# Patient Record
Sex: FEMALE | Race: OTHER | Employment: UNEMPLOYED | URBAN - METROPOLITAN AREA
[De-identification: names, ages, dates, MRNs, and addresses within clinical notes are randomized per-mention and may not be internally consistent; named-entity substitution may affect disease eponyms.]

---

## 2018-08-20 ENCOUNTER — HOSPITAL ENCOUNTER (EMERGENCY)
Age: 33
Discharge: HOME | End: 2018-08-20
Attending: EMERGENCY MEDICINE
Payer: SELF-PAY

## 2018-08-20 VITALS
HEART RATE: 74 BPM | HEIGHT: 70 IN | OXYGEN SATURATION: 100 % | SYSTOLIC BLOOD PRESSURE: 110 MMHG | DIASTOLIC BLOOD PRESSURE: 60 MMHG | BODY MASS INDEX: 27.63 KG/M2 | TEMPERATURE: 97.8 F | WEIGHT: 193 LBS | RESPIRATION RATE: 16 BRPM

## 2018-08-20 DIAGNOSIS — N93.8 DUB (DYSFUNCTIONAL UTERINE BLEEDING): Primary | ICD-10-CM

## 2018-08-20 LAB
BASOPHILS # BLD: 0 K/UL
BASOPHILS NFR BLD: 0 % (ref 0–2)
DIFFERENTIAL METHOD BLD: ABNORMAL
EOSINOPHIL # BLD: 0.1 K/UL
EOSINOPHIL NFR BLD: 3 % (ref 0.5–7.8)
ERYTHROCYTE [DISTWIDTH] IN BLOOD BY AUTOMATED COUNT: 16.8 %
HCG UR QL: NEGATIVE
HCT VFR BLD AUTO: 32.5 % (ref 35.8–46.3)
HGB BLD-MCNC: 10.4 G/DL (ref 11.7–15.4)
IMM GRANULOCYTES # BLD: 0 K/UL
IMM GRANULOCYTES NFR BLD AUTO: 1 % (ref 0–5)
LYMPHOCYTES # BLD: 1 K/UL
LYMPHOCYTES NFR BLD: 33 % (ref 13–44)
MCH RBC QN AUTO: 29.1 PG (ref 26.1–32.9)
MCHC RBC AUTO-ENTMCNC: 32 G/DL (ref 31.4–35)
MCV RBC AUTO: 91 FL (ref 79.6–97.8)
MONOCYTES # BLD: 0.3 K/UL
MONOCYTES NFR BLD: 11 % (ref 4–12)
NEUTS SEG # BLD: 1.7 K/UL
NEUTS SEG NFR BLD: 53 % (ref 43–78)
NRBC # BLD: 0 K/UL (ref 0–0.2)
PLATELET # BLD AUTO: 297 K/UL (ref 150–450)
PMV BLD AUTO: 10.3 FL (ref 9.4–12.3)
RBC # BLD AUTO: 3.57 M/UL (ref 4.05–5.2)
WBC # BLD AUTO: 3.1 K/UL (ref 4.3–11.1)

## 2018-08-20 PROCEDURE — 85025 COMPLETE CBC W/AUTO DIFF WBC: CPT

## 2018-08-20 PROCEDURE — 81025 URINE PREGNANCY TEST: CPT

## 2018-08-20 PROCEDURE — 99284 EMERGENCY DEPT VISIT MOD MDM: CPT | Performed by: PHYSICIAN ASSISTANT

## 2018-08-20 PROCEDURE — 81003 URINALYSIS AUTO W/O SCOPE: CPT | Performed by: PHYSICIAN ASSISTANT

## 2018-08-20 RX ORDER — MEDROXYPROGESTERONE ACETATE 10 MG/1
10 TABLET ORAL DAILY
Qty: 7 TAB | Refills: 0 | Status: SHIPPED | OUTPATIENT
Start: 2018-08-20 | End: 2018-08-27

## 2018-08-20 NOTE — ED NOTES
States vaginal bleeding heavy for 10 days. Hx of the this in the past and was given depo and bleeding stopped.

## 2018-08-20 NOTE — ED NOTES
I have reviewed discharge instructions with the patient. The patient verbalized understanding. Patient left ED via Discharge Method: ambulatory to Home with self. Opportunity for questions and clarification provided. Patient given 1 scripts. To continue your aftercare when you leave the hospital, you may receive an automated call from our care team to check in on how you are doing. This is a free service and part of our promise to provide the best care and service to meet your aftercare needs.  If you have questions, or wish to unsubscribe from this service please call [phone]. Thank you for Choosing our [address] [place] [address] Emergency Department.

## 2018-08-20 NOTE — DISCHARGE INSTRUCTIONS
Sangrado uterino anormal: Instrucciones de cuidado - [ Abnormal Uterine Bleeding: Care Instructions ]  Instrucciones de cuidado    El sangrado uterino anormal (AUB, por greta siglas en inglés) es un sangrado irregular del útero que dura más o es más intenso de lo normal o que no ocurre en el momento habitual para usted. A veces, se debe a cambios en los niveles hormonales. También puede estar causado por crecimientos en el útero, tales jeff fibromas o pólipos. A veces no se puede encontrar la causa. Podría tener mucho sangrado cuando no está esperando gold período. Gold médico puede sugerirle tanika prueba de embarazo si jonathan que está [place]. La atención de seguimiento es tanika parte clave de gold tratamiento y seguridad. Asegúrese de hacer y acudir a todas las citas, y llame a gold médico si está teniendo problemas. También es tanika buena idea saber los resultados de greta exámenes y mantener tanika lista de los medicamentos que chani. ¿Cómo puede cuidarse en el hogar? · Sea kevin con los medicamentos. [place] los analgésicos (medicamentos para el dolor) exactamente jeff le fueron indicados. ¨ Si el médico le recetó un analgésico, tómelo según las indicaciones. ¨ Si no está tomando un analgésico recetado, pregúntele a gold médico si puede martha mickey de [employer]. · Podría faltarle dat por la pérdida de tamika. Coma tanika dieta equilibrada con alto contenido de dat y vitamina C. Entre los alimentos ricos en dat se encuentran las yoan nichole, los [place], los [place], los frijoles (habichuelas) y las verduras de hojas verdes. Pregúntele a gold médico si necesita martha pastillas de dat o un multivitamínico.  ¿Cuándo debe pedir ayuda? Llame al 911 en cualquier momento que considere que necesita atención de [place].  Por ejemplo, llame si:    · Se desmayó (perdió el conocimiento).    Llame a gold médico ahora mismo o busque atención médica inmediata si:    · Tiene dolor repentino e intenso en el abdomen o la pelvis.     · Tiene sangrado vaginal intenso. Empapa greta toallas sanitarias o tampones habituales cada hora norma 2 o más horas.     · Siente mareos o aturdimiento, o que está a punto de desmayarse.    Preste especial atención a los cambios en gold janell y asegúrese de comunicarse con gold médico si:    · Tiene un dolor nuevo en el abdomen o la pelvis.     · Tiene fiebre.     · El sangrado empeora o dura más de 1 semana.     · Piensa que podría estar embarazada. ¿Dónde puede encontrar más información en inglés? Alysha Redd a http://paul-alicia.info/. Charlie Giraldo F396 en la búsqueda para aprender más acerca de \"Sangrado uterino anormal: Instrucciones de cuidado - [ Abnormal Uterine Bleeding: Care Instructions ]. \"  Revisado: 6 octubre, 2017  Versión del contenido: 11.7  © 5164-1730 Healthwise, Incorporated. Las instrucciones de cuidado fueron adaptadas bajo licencia por [employer] (which disclaims liability or warranty for this information). Si usted tiene [place] afección médica o sobre estas instrucciones, siempre pregunte a gold profesional de janell. Healthwise, Incorporated niega toda garantía o responsabilidad por gold uso de esta información.

## 2018-08-20 NOTE — ED PROVIDER NOTES
HPI Comments: Pt with h/o off and on heavy periods, no pain, no pmd, no meds, periods are regular     Patient is a 29 y.o. female presenting with vaginal bleeding. The history is provided by the patient. Vaginal Bleeding   This is a new problem. The current episode started more than 1 week ago. The problem occurs constantly. The problem has not changed since onset. Pertinent negatives include no abdominal pain. Nothing aggravates the symptoms. Nothing relieves the symptoms. She has tried nothing for the symptoms. The treatment provided no relief. No past medical history on file. No past surgical history on file. No family history on file. Social History     Social History    Marital status: SINGLE     Spouse name: N/A    Number of children: N/A    Years of education: N/A     Occupational History    Not on file. Social History Main Topics    Smoking status: Not on file    Smokeless tobacco: Not on file    Alcohol use Not on file    Drug use: Not on file    Sexual activity: Not on file     Other Topics Concern    Not on file     Social History Narrative         ALLERGIES: Review of patient's allergies indicates no known allergies. Review of Systems   Gastrointestinal: Negative for abdominal pain. Genitourinary: Positive for vaginal bleeding. All other systems reviewed and are negative. Vitals:    08/20/18 1305   BP: 119/56   Pulse: 86   Resp: 16   Temp: 97.8 °F (36.6 °C)   SpO2: 100%   Weight: 87.5 kg (193 lb)   Height: 5' 10\" (1.778 m)            Physical Exam   Constitutional: She is oriented to person, place, and time. She appears well-developed and well-nourished. No distress. HENT:   Head: Normocephalic and atraumatic. Right Ear: External ear normal.   Left Ear: External ear normal.   Eyes: Conjunctivae and EOM are normal. Pupils are equal, round, and reactive to light. Neck: Normal range of motion. Neck supple. Cardiovascular: Normal rate and regular rhythm. Pulmonary/Chest: Effort normal and breath sounds normal. No respiratory distress. She has no wheezes. Abdominal: Soft. Bowel sounds are normal. There is no tenderness. There is no rebound and no guarding. Musculoskeletal: Normal range of motion. She exhibits no edema. Neurological: She is alert and oriented to person, place, and time. Skin: Skin is warm. Nursing note and vitals reviewed.        MDM  Number of Diagnoses or Management Options  Diagnosis management comments: hgb 10.4  Will treat daily iron and provera, stressed need for gyn/pmd        Amount and/or Complexity of Data Reviewed  Clinical lab tests: ordered and reviewed    Risk of Complications, Morbidity, and/or Mortality  Presenting problems: moderate  Diagnostic procedures: moderate  Management options: low    Patient Progress  Patient progress: improved        ED Course       Procedures

## 2018-08-20 NOTE — LETTER
[facility] EMERGENCY DEPT 
[address] [place] [id number] 
[phone] Work/School Note Date: 8/20/2018 To Whom It May concern: 
 
Marialuisa Selby was seen and treated today in the emergency room by the following provider(s): 
Attending Provider: Raysa Davis MD 
Physician Assistant: BOO Lopes. Marialuisa Selby may return to work on 8-21-18. Sincerely, BOO Lopes

## 2018-12-29 ENCOUNTER — APPOINTMENT (OUTPATIENT)
Dept: ULTRASOUND IMAGING | Age: 33
End: 2018-12-29
Attending: STUDENT IN AN ORGANIZED HEALTH CARE EDUCATION/TRAINING PROGRAM
Payer: SELF-PAY

## 2018-12-29 ENCOUNTER — HOSPITAL ENCOUNTER (EMERGENCY)
Age: 33
Discharge: HOME | End: 2018-12-29
Attending: STUDENT IN AN ORGANIZED HEALTH CARE EDUCATION/TRAINING PROGRAM
Payer: SELF-PAY

## 2018-12-29 VITALS
RESPIRATION RATE: 16 BRPM | SYSTOLIC BLOOD PRESSURE: 114 MMHG | TEMPERATURE: 98 F | HEIGHT: 70 IN | HEART RATE: 90 BPM | OXYGEN SATURATION: 100 % | WEIGHT: 199 LBS | DIASTOLIC BLOOD PRESSURE: 57 MMHG | BODY MASS INDEX: 28.49 KG/M2

## 2018-12-29 DIAGNOSIS — N93.9 ABNORMAL UTERINE BLEEDING: Primary | ICD-10-CM

## 2018-12-29 LAB
BACTERIA URNS QL MICRO: 0 /HPF
CASTS URNS QL MICRO: NORMAL /LPF
CRYSTALS URNS QL MICRO: NORMAL /LPF
EPI CELLS #/AREA URNS HPF: NORMAL /HPF
ERYTHROCYTE [DISTWIDTH] IN BLOOD BY AUTOMATED COUNT: 13.4 % (ref 11.9–14.6)
HCG UR QL: NEGATIVE
HCT VFR BLD AUTO: 25.4 % (ref 35.8–46.3)
HGB BLD-MCNC: 8 G/DL (ref 11.7–15.4)
MCH RBC QN AUTO: 30.2 PG (ref 26.1–32.9)
MCHC RBC AUTO-ENTMCNC: 31.5 G/DL (ref 31.4–35)
MCV RBC AUTO: 95.8 FL (ref 79.6–97.8)
NRBC # BLD: 0.03 K/UL (ref 0–0.2)
PLATELET # BLD AUTO: 357 K/UL (ref 150–450)
PMV BLD AUTO: 9.5 FL (ref 9.4–12.3)
RBC # BLD AUTO: 2.65 M/UL (ref 4.05–5.2)
RBC #/AREA URNS HPF: NORMAL /HPF
WBC # BLD AUTO: 5.3 K/UL (ref 4.3–11.1)
WBC URNS QL MICRO: NORMAL /HPF

## 2018-12-29 PROCEDURE — 85027 COMPLETE CBC AUTOMATED: CPT

## 2018-12-29 PROCEDURE — 76856 US EXAM PELVIC COMPLETE: CPT

## 2018-12-29 PROCEDURE — 99284 EMERGENCY DEPT VISIT MOD MDM: CPT | Performed by: STUDENT IN AN ORGANIZED HEALTH CARE EDUCATION/TRAINING PROGRAM

## 2018-12-29 PROCEDURE — 81003 URINALYSIS AUTO W/O SCOPE: CPT | Performed by: STUDENT IN AN ORGANIZED HEALTH CARE EDUCATION/TRAINING PROGRAM

## 2018-12-29 PROCEDURE — 81025 URINE PREGNANCY TEST: CPT

## 2018-12-29 PROCEDURE — 81015 MICROSCOPIC EXAM OF URINE: CPT

## 2018-12-29 RX ORDER — NORGESTIMATE AND ETHINYL ESTRADIOL 0.25-0.035
KIT ORAL
Qty: 1 DOSE PACK | Refills: 0 | Status: SHIPPED | OUTPATIENT
Start: 2018-12-29

## 2018-12-29 NOTE — ED TRIAGE NOTES
Pt here with 14 days of vaginal bleeding. Reports is passing clots today so decided to come to ED.   Reports called GYN earlier this week, but told she needed to stop bleeding for them to evaluate

## 2018-12-29 NOTE — ED NOTES
I have reviewed discharge instructions with the patient. The patient verbalized understanding. Patient left ED via Discharge Method: ambulatory to Home with friend / family. Opportunity for questions and clarification provided. Patient given 1 scripts. To continue your aftercare when you leave the hospital, you may receive an automated call from our care team to check in on how you are doing. This is a free service and part of our promise to provide the best care and service to meet your aftercare needs.  If you have questions, or wish to unsubscribe from this service please call [phone]. Thank you for Choosing our [facility] Emergency Department.

## 2018-12-29 NOTE — ED NOTES
Pt sts having heavy vag bleeding over last 14 days; her gyn doctor sent to er. Pt sts she having no pain or other sx at this time.  sts has used multi pads and tampons over last 6 hr.  at bedside

## 2018-12-29 NOTE — ED PROVIDER NOTES
presents to the emergency department with reports of abnormal uterine bleeding. Patient states she started bleeding probably 2 weeks ago and has been bleeding heavily since. She contacted her OB/GYN provider who instructed her to come to the emergency department as they could not see her until the bleeding had stopped. Patient denies any other associated symptoms. There is no associated pain, fever, nausea, shortness of breath, lightheaded or dizzy feeling. She denies fatigue or weakness. She denies bleeding from anywhere else. She has experienced abnormal uterine bleeding in the past that was treated with oral contraceptive pills. She denies associated discharge, hematuria or dysuria. The history is provided by the patient and the spouse. Vaginal Bleeding   This is a new problem. The current episode started more than 1 week ago. The problem occurs constantly. The problem has not changed since onset. Pertinent negatives include no chest pain, no abdominal pain, no headaches and no shortness of breath. The treatment provided no relief. History reviewed. No pertinent past medical history. History reviewed. No pertinent surgical history. History reviewed. No pertinent family history.     Social History     Socioeconomic History    Marital status: SINGLE     Spouse name: Not on file    Number of children: Not on file    Years of education: Not on file    Highest education level: Not on file   Social Needs    Financial resource strain: Not on file    Food insecurity - worry: Not on file    Food insecurity - inability: Not on file    Transportation needs - medical: Not on file   ebindle needs - non-medical: Not on file   Occupational History    Not on file   Tobacco Use    Smoking status: Never Smoker   Substance and Sexual Activity    Alcohol use: No     Frequency: Never    Drug use: No    Sexual activity: Not on file   Other Topics Concern    Not on file   Social History Narrative    Not on file         ALLERGIES: Patient has no known allergies. Review of Systems   Constitutional: Negative for chills, diaphoresis and fever. HENT: Negative for congestion, sneezing and sore throat. Eyes: Negative for visual disturbance. Respiratory: Negative for cough, chest tightness, shortness of breath and wheezing. Cardiovascular: Negative for chest pain and leg swelling. Gastrointestinal: Negative for abdominal pain, blood in stool, diarrhea, nausea and vomiting. Endocrine: Negative for polyuria. Genitourinary: Positive for vaginal bleeding. Negative for difficulty urinating, dysuria, flank pain, hematuria and urgency. Musculoskeletal: Negative for back pain, myalgias, neck pain and neck stiffness. Skin: Negative for color change and rash. Neurological: Negative for dizziness, syncope, speech difficulty, weakness, light-headedness, numbness and headaches. Psychiatric/Behavioral: Negative for behavioral problems. All other systems reviewed and are negative. Vitals:    12/29/18 1236   BP: 108/64   Pulse: 92   Resp: 17   Temp: 97.8 °F (36.6 °C)   SpO2: 100%   Weight: 90.3 kg (199 lb)   Height: 5' 10\" (1.778 m)            Physical Exam   Constitutional: She is oriented to person, place, and time. She appears well-developed and well-nourished. No distress. Well-appearing female patient Alert and oriented to person place and time. No acute distress, speaks in clear, fluid sentences. HENT:   Head: Normocephalic and atraumatic. Right Ear: External ear normal.   Left Ear: External ear normal.   Nose: Nose normal.   Eyes: EOM are normal. Pupils are equal, round, and reactive to light. Neck: Normal range of motion. Cardiovascular: Normal rate, regular rhythm, normal heart sounds and intact distal pulses. Exam reveals no gallop and no friction rub. No murmur heard. Pulmonary/Chest: Effort normal and breath sounds normal. No respiratory distress.  She has no wheezes. She has no rales. She exhibits no tenderness. Abdominal: Soft. She exhibits no distension and no mass. There is no tenderness. There is no rebound and no guarding. No hernia. No focal findings   Musculoskeletal: Normal range of motion. She exhibits no edema, tenderness or deformity. Neurological: She is alert and oriented to person, place, and time. No cranial nerve deficit. Skin: Skin is warm and dry. She is not diaphoretic. Nursing note and vitals reviewed.        MDM       Procedures

## 2018-12-29 NOTE — DISCHARGE INSTRUCTIONS
Abnormal Uterine Bleeding: Care Instructions  Your Care Instructions    Abnormal uterine bleeding (AUB) is irregular bleeding from the uterus that is longer or heavier than usual or does not occur at your regular time. Sometimes it is caused by changes in hormone levels. It can also be caused by growths in the uterus, such as fibroids or polyps. Sometimes a cause cannot be found. You may have heavy bleeding when you are not expecting your period. Your doctor may suggest a pregnancy test, if you think you are pregnant. Follow-up care is a key part of your treatment and safety. Be sure to make and go to all appointments, and call your doctor if you are having problems. It's also a good idea to know your test results and keep a list of the medicines you take. How can you care for yourself at home? · Be safe with medicines. Take pain medicines exactly as directed. ? If the doctor gave you a prescription medicine for pain, take it as prescribed. ? If you are not taking a prescription pain medicine, ask your doctor if you can take an over-the-counter medicine. · You may be low in iron because of blood loss. Eat a balanced diet that is high in iron and vitamin C. Foods rich in iron include red meat, shellfish, eggs, beans, and leafy green vegetables. Talk to your doctor about whether you need to take iron pills or a multivitamin. When should you call for help? Call 911 anytime you think you may need emergency care. For example, call if:    · You passed out (lost consciousness).    Call your doctor now or seek immediate medical care if:    · You have new or worse belly or pelvic pain.     · You have severe vaginal bleeding.     · You feel dizzy or lightheaded, or you feel like you may faint.    Watch closely for changes in your health, and be sure to contact your doctor if:    · You think you may be pregnant.     · Your bleeding gets worse.     · You do not get better as expected.    Where can you learn more?  Go to http://paul-alicia.info/. Enter J762 in the search box to learn more about \"Abnormal Uterine Bleeding: Care Instructions. \"  Current as of: May 15, 2018  Content Version: 11.8  © 6245-3633 Healthwise, Incorporated. Care instructions adapted under license by [employer] (which disclaims liability or warranty for this information). If you have questions about a medical condition or this instruction, always ask your healthcare professional. [place] [address] any warranty or liability for your use of this information.

## 2022-11-07 ENCOUNTER — HOSPITAL ENCOUNTER (EMERGENCY)
Dept: ULTRASOUND IMAGING | Age: 37
Discharge: HOME | End: 2022-11-10

## 2022-11-07 ENCOUNTER — HOSPITAL ENCOUNTER (EMERGENCY)
Age: 37
Discharge: HOME | End: 2022-11-07
Attending: EMERGENCY MEDICINE

## 2022-11-07 VITALS
BODY MASS INDEX: 28.34 KG/M2 | SYSTOLIC BLOOD PRESSURE: 113 MMHG | HEIGHT: 68 IN | TEMPERATURE: 97.9 F | WEIGHT: 187 LBS | DIASTOLIC BLOOD PRESSURE: 53 MMHG | RESPIRATION RATE: 19 BRPM | HEART RATE: 58 BPM | OXYGEN SATURATION: 100 %

## 2022-11-07 DIAGNOSIS — N93.9 VAGINAL BLEEDING: Primary | ICD-10-CM

## 2022-11-07 DIAGNOSIS — D21.9 FIBROID: ICD-10-CM

## 2022-11-07 DIAGNOSIS — D64.9 ANEMIA, UNSPECIFIED TYPE: ICD-10-CM

## 2022-11-07 LAB
ALBUMIN SERPL-MCNC: 4 G/DL (ref 3.5–5)
ALBUMIN/GLOB SERPL: 0.9 {RATIO} (ref 0.4–1.6)
ALP SERPL-CCNC: 74 U/L (ref 50–136)
ALT SERPL-CCNC: 18 U/L (ref 12–65)
ANION GAP SERPL CALC-SCNC: 6 MMOL/L (ref 2–11)
AST SERPL-CCNC: 15 U/L (ref 15–37)
BASOPHILS # BLD: 0.1 K/UL (ref 0–0.2)
BASOPHILS NFR BLD: 1 % (ref 0–2)
BILIRUB SERPL-MCNC: 0.2 MG/DL (ref 0.2–1.1)
BUN SERPL-MCNC: 11 MG/DL (ref 6–23)
CALCIUM SERPL-MCNC: 9.6 MG/DL (ref 8.3–10.4)
CHLORIDE SERPL-SCNC: 104 MMOL/L (ref 101–110)
CO2 SERPL-SCNC: 27 MMOL/L (ref 21–32)
CREAT SERPL-MCNC: 0.78 MG/DL (ref 0.6–1)
DIFFERENTIAL METHOD BLD: ABNORMAL
EOSINOPHIL # BLD: 0.1 K/UL (ref 0–0.8)
EOSINOPHIL NFR BLD: 1 % (ref 0.5–7.8)
ERYTHROCYTE [DISTWIDTH] IN BLOOD BY AUTOMATED COUNT: 13 % (ref 11.9–14.6)
GLOBULIN SER CALC-MCNC: 4.5 G/DL (ref 2.8–4.5)
GLUCOSE SERPL-MCNC: 84 MG/DL (ref 65–100)
HCG UR QL: NEGATIVE
HCT VFR BLD AUTO: 31.8 % (ref 35.8–46.3)
HGB BLD-MCNC: 10 G/DL (ref 11.7–15.4)
IMM GRANULOCYTES # BLD AUTO: 0 K/UL (ref 0–0.5)
IMM GRANULOCYTES NFR BLD AUTO: 1 % (ref 0–5)
INR PPP: 0.9
LYMPHOCYTES # BLD: 1.7 K/UL (ref 0.5–4.6)
LYMPHOCYTES NFR BLD: 26 % (ref 13–44)
MCH RBC QN AUTO: 28.7 PG (ref 26.1–32.9)
MCHC RBC AUTO-ENTMCNC: 31.4 G/DL (ref 31.4–35)
MCV RBC AUTO: 91.1 FL (ref 82–102)
MONOCYTES # BLD: 0.3 K/UL (ref 0.1–1.3)
MONOCYTES NFR BLD: 5 % (ref 4–12)
NEUTS SEG # BLD: 4.3 K/UL (ref 1.7–8.2)
NEUTS SEG NFR BLD: 67 % (ref 43–78)
NRBC # BLD: 0 K/UL (ref 0–0.2)
PLATELET # BLD AUTO: 437 K/UL (ref 150–450)
PMV BLD AUTO: 9.8 FL (ref 9.4–12.3)
POTASSIUM SERPL-SCNC: 3.8 MMOL/L (ref 3.5–5.1)
PROT SERPL-MCNC: 8.5 G/DL (ref 6.3–8.2)
PROTHROMBIN TIME: 13 SEC (ref 12.6–14.3)
RBC # BLD AUTO: 3.49 M/UL (ref 4.05–5.2)
SODIUM SERPL-SCNC: 137 MMOL/L (ref 133–143)
TSH W FREE THYROID IF ABNORMAL: 1.65 UIU/ML (ref 0.36–3.74)
WBC # BLD AUTO: 6.5 K/UL (ref 4.3–11.1)

## 2022-11-07 PROCEDURE — 81025 URINE PREGNANCY TEST: CPT

## 2022-11-07 PROCEDURE — 76830 TRANSVAGINAL US NON-OB: CPT

## 2022-11-07 PROCEDURE — 80053 COMPREHEN METABOLIC PANEL: CPT

## 2022-11-07 PROCEDURE — 85025 COMPLETE CBC W/AUTO DIFF WBC: CPT

## 2022-11-07 PROCEDURE — 85610 PROTHROMBIN TIME: CPT

## 2022-11-07 PROCEDURE — 99284 EMERGENCY DEPT VISIT MOD MDM: CPT | Performed by: EMERGENCY MEDICINE

## 2022-11-07 PROCEDURE — 84443 ASSAY THYROID STIM HORMONE: CPT

## 2022-11-07 PROCEDURE — 2580000003 HC RX 258: Performed by: STUDENT IN AN ORGANIZED HEALTH CARE EDUCATION/TRAINING PROGRAM

## 2022-11-07 RX ORDER — ACETAMINOPHEN AND CODEINE PHOSPHATE 120; 12 MG/5ML; MG/5ML
1 SOLUTION ORAL DAILY
Qty: 28 TABLET | Refills: 0 | Status: SHIPPED | OUTPATIENT
Start: 2022-11-07 | End: 2022-12-05

## 2022-11-07 RX ORDER — 0.9 % SODIUM CHLORIDE 0.9 %
1000 INTRAVENOUS SOLUTION INTRAVENOUS
Status: COMPLETED | OUTPATIENT
Start: 2022-11-07 | End: 2022-11-07

## 2022-11-07 RX ADMIN — SODIUM CHLORIDE 1000 ML: 9 INJECTION, SOLUTION INTRAVENOUS at 12:00

## 2022-11-07 ASSESSMENT — ENCOUNTER SYMPTOMS
COUGH: 0
SORE THROAT: 0
CONSTIPATION: 0
EYE PAIN: 0
ABDOMINAL PAIN: 0
DIARRHEA: 0
VOMITING: 0
PHOTOPHOBIA: 0
BLOOD IN STOOL: 0
SHORTNESS OF BREATH: 0
WHEEZING: 0
COLOR CHANGE: 0
NAUSEA: 0
RHINORRHEA: 0
CHEST TIGHTNESS: 0
VOICE CHANGE: 0
EYE DISCHARGE: 0
SINUS PRESSURE: 0
APNEA: 0
EYE REDNESS: 0

## 2022-11-07 ASSESSMENT — PAIN SCALES - GENERAL
PAINLEVEL_OUTOF10: 6
PAINLEVEL_OUTOF10: 6

## 2022-11-07 NOTE — ED NOTES
I have reviewed discharge instructions with the patient and spouse. The patient and spouse verbalized understanding. Patient left ED via Discharge Method: ambulatory to Home with spouse. Opportunity for questions and clarification provided. Patient given 1 scripts. To continue your aftercare when you leave the hospital, you may receive an automated call from our care team to check in on how you are doing. This is a free service and part of our promise to provide the best care and service to meet your aftercare needs.  If you have questions, or wish to unsubscribe from this service please call [phone]. Thank you for Choosing our [facility] Emergency Department.       Nicola Byrne, RN  11/07/22 9772

## 2022-11-07 NOTE — ED TRIAGE NOTES
Pt states that her period started around 10/26. Pt states that ever since then, she's continued to have vaginal bleeding and pelvic cramping that started yesterday. Pt denies N/V/D.

## 2022-11-07 NOTE — DISCHARGE INSTRUCTIONS
Your lab work today was unremarkable except for a very small decrease in your hemoglobin which is consistent with anemia. This is expected with the amount of vaginal bleeding you have been having. No indication for blood transfusion today. Your pelvic ultrasound revealed a small fibroid which could be contributing to your bleeding. This is a benign soft tissue tumor as we discussed. We will start you on a 28-day course of hormones to stop your bleeding. I have also given you a referral to an OB/GYN doctor to call to schedule follow-up for further management. If you develop any new symptoms or worsening symptoms, especially symptoms such as lightheadedness, loss of consciousness then please return here. Otherwise please follow-up with the GYN doctor. As we discussed, I did not find a life threatening cause of your symptoms today. However, THAT DOES NOT MEAN IT COULD NOT DEVELOP. If you develop ANY new or worsening symptoms, it is critical that you return for re-evaluation. This includes any symptoms that are concerning to you, especially symptoms such as vomiting, difficulty breathing, loss of consciousness, dizziness, palpitations, profuse bleeding. If you do not return for re-evaluation, you risk serious complications, including death.

## 2022-11-07 NOTE — Clinical Note
Marylee Felty was seen and treated in our emergency department on 11/7/2022. She may return to work on 11/08/2022. If you have any questions or concerns, please don't hesitate to call.       Obdulia Devine

## 2022-11-07 NOTE — ED PROVIDER NOTES
Emergency Department Provider Note                   PCP:                None Provider               Age: 30 y.o. Sex: female       ICD-10-CM    1. Vaginal bleeding  N93.9       2. Anemia, unspecified type  D64.9       3. Fibroid  D21.9           DISPOSITION Decision To Discharge 11/07/2022 02:23:00 PM        MDM  Number of Diagnoses or Management Options  Anemia, unspecified type  Fibroid  Vaginal bleeding  Diagnosis management comments: 11:33 AM  ovrian cyst, endometriosis, uterine fibroid, ovarian torsion, abnormal vaginal bleeding, PCOS    330 PM  45-year-old female patient presenting with prolonged vaginal bleeding for the past 2 weeks. Lab today showed a small decrease in hemoglobin at 10.0. No indication for blood transfusion. Patient is not symptomatic from her anemia. No lightheadedness or dizziness. Neurologic exam normal.  Normal vitals. Hemodynamically stable. Pregnancy test negative. Would expect severe one-sided pain with vomiting if this was a torsion. Pelvic ultrasound did reveal a fibroid which could explain some of the bleeding. Patient has had this problem in the past with success with partner treatment. Started patient on course of norethindrone pack. Arranged her follow-up with Dr. Rios Kaur. Counseled her on warning signs to return immediately for including but not limited to signs of symptomatic anemia, profuse bleeding. Patient was also advised to start an iron supplement over-the-counter. Patient verbalized understanding and agreed with the plan. Patient was discharged in stable condition.        Amount and/or Complexity of Data Reviewed  Clinical lab tests: ordered and reviewed  Tests in the radiology section of CPT®: ordered and reviewed  Independent visualization of images, tracings, or specimens: yes    Risk of Complications, Morbidity, and/or Mortality  Presenting problems: moderate  Diagnostic procedures: moderate  Management options: moderate    Patient Progress  Patient progress: stable       ED Course as of 11/07/22 1434   Mon Nov 07, 2022   1220 12:20 PM  Slightly anemic. No microcytosis. No prior labwork to compare to. Hemodynamically stable at this time. [NR]      ED Course User Index  [NR] HARDEEP White        Orders Placed This Encounter   Procedures    US PELVIS COMPLETE    Pregnancy, Urine    CBC with Auto Differential    Protime-INR    TSH with Reflex    Comprehensive Metabolic Panel    POCT Urine Dipstick        Medications   0.9 % sodium chloride bolus (1,000 mLs IntraVENous New Bag 11/7/22 1200)       New Prescriptions    NORETHINDRONE (ORTHO MICRONOR) 0.35 MG TABLET    Take 1 tablet by mouth daily for 28 days        Rosa Ang is a 30 y.o. female who presents to the Emergency Department with chief complaint of    Chief Complaint   Patient presents with    Vaginal Bleeding      53-year-old female G1, P1 patient presents today complaining of vaginal bleeding for the past 12 days. Reports her period started on October 26 and has continued since then. Reports her symptoms have been mostly constant. Reports going through about 1 tampon every couple hours. Reports a few days ago she started having some intermittent cramping as well. Reports this is happened in the past and required hormones to get the bleeding to stop. Reports he does not currently have an OB/GYN. She denies lightheadedness, dizziness, syncope, weakness, fatigue. Denies dysuria, urinary frequency, urinary urgency. Denies rectal bleeding, melena, blood in stool. Denies chest pain or shortness of breath. Denies all other symptoms today. Patient is primary historian and quality seems reliable. The history is provided by the patient. No  was used. Review of Systems   Constitutional:  Negative for appetite change, chills, fatigue, fever and unexpected weight change.    HENT:  Negative for congestion, ear pain, hearing loss, postnasal drip, rhinorrhea, sinus pressure, sore throat and voice change. Eyes:  Negative for photophobia, pain, discharge, redness and visual disturbance. Respiratory:  Negative for apnea, cough, chest tightness, shortness of breath and wheezing. Cardiovascular:  Negative for chest pain, palpitations and leg swelling. Gastrointestinal:  Negative for abdominal pain, blood in stool, constipation, diarrhea, nausea and vomiting. Endocrine: Negative for polydipsia, polyphagia and polyuria. Genitourinary:  Positive for pelvic pain and vaginal bleeding. Negative for decreased urine volume, dysuria, flank pain, frequency and hematuria. Musculoskeletal:  Negative for arthralgias, joint swelling, myalgias and neck stiffness. Skin:  Negative for color change, rash and wound. Neurological:  Negative for dizziness, syncope, speech difficulty, weakness, light-headedness and headaches. Hematological:  Negative for adenopathy. Does not bruise/bleed easily. Psychiatric/Behavioral:  Negative for confusion, self-injury, sleep disturbance and suicidal ideas. All other systems reviewed and are negative. History reviewed. No pertinent past medical history. History reviewed. No pertinent surgical history. History reviewed. No pertinent family history. Social History     Socioeconomic History    Marital status:      Spouse name: None    Number of children: None    Years of education: None    Highest education level: None         Patient has no known allergies. Previous Medications    No medications on file        Vitals signs and nursing note reviewed. Patient Vitals for the past 4 hrs:   Temp Pulse Resp BP SpO2   11/07/22 1115 97.9 °F (36.6 °C) 58 19 (!) 113/53 100 %   11/07/22 1036 98.1 °F (36.7 °C) 68 18 (!) 104/52 99 %          Physical Exam  Vitals and nursing note reviewed. Exam conducted with a chaperone present. Constitutional:       General: She is not in acute distress.      Appearance: Normal appearance. She is not ill-appearing, toxic-appearing or diaphoretic. Comments: Very pleasant and cooperative. Very well-appearing. No acute distress. Even nonlabored respirations. Answers all questions appropriately. HENT:      Head: Normocephalic and atraumatic. Right Ear: Tympanic membrane, ear canal and external ear normal.      Left Ear: Tympanic membrane, ear canal and external ear normal.      Nose: Nose normal.      Mouth/Throat:      Mouth: Mucous membranes are moist.   Eyes:      General: No scleral icterus. Right eye: No discharge. Left eye: No discharge. Extraocular Movements: Extraocular movements intact. Conjunctiva/sclera: Conjunctivae normal.      Pupils: Pupils are equal, round, and reactive to light. Comments: No obvious conjunctival pallor   Cardiovascular:      Rate and Rhythm: Normal rate and regular rhythm. Pulses: Normal pulses. Heart sounds: Normal heart sounds. No murmur heard. Pulmonary:      Effort: Pulmonary effort is normal. No respiratory distress. Breath sounds: Normal breath sounds. No stridor. No wheezing, rhonchi or rales. Chest:      Chest wall: No tenderness. Abdominal:      General: Abdomen is flat. A surgical scar is present. Bowel sounds are normal. There is no distension. Palpations: Abdomen is soft. There is no mass. Tenderness: There is abdominal tenderness. There is no right CVA tenderness, left CVA tenderness, guarding or rebound. Negative signs include James's sign, Rovsing's sign, McBurney's sign, psoas sign and obturator sign. Hernia: No hernia is present. Comments: Vertical linear scar from prior  noted. Very mild diffuse suprapubic tenderness noted. No peritoneal signs. Negative McBurney point. Negative James sign. Negative Rovsing sign. Negative psoas sign. Negative obturator sign.   Otherwise benign abdominal exam.   Genitourinary:     Comments: Exam recommended but patient refused  Musculoskeletal:         General: Normal range of motion. Cervical back: Normal range of motion and neck supple. No rigidity or tenderness. Right lower leg: No edema. Left lower leg: No edema. Lymphadenopathy:      Cervical: No cervical adenopathy. Skin:     General: Skin is warm and dry. Capillary Refill: Capillary refill takes less than 2 seconds. Coloration: Skin is not jaundiced. Neurological:      General: No focal deficit present. Mental Status: She is alert and oriented to person, place, and time. Mental status is at baseline. Psychiatric:         Mood and Affect: Mood normal.         Behavior: Behavior normal.         Thought Content: Thought content normal.         Judgment: Judgment normal.        Procedures    Results for orders placed or performed during the hospital encounter of 11/07/22   US PELVIS COMPLETE    Narrative    PELVIC ULTRASOUND. HISTORY:  Vaginal bleeding for 2 weeks and cramping. TECHNIQUE:  Transabdominal and endovaginal  images were obtained of the pelvis. FINDINGS:    - UTERUS: 9.6 x 7.1 x 6.0 cm. Endometrial stripe measures 7 mm. There is a 2.1  x 1.7 x 1.5 cm intramural hypoechoic structure within the anterior uterine wall,  likely reflecting a fibroid. -  RIGHT OVARY:  5.4 x 4.8 x 4.1 cm. 4.3 x 3.9 x 3.3 cm simple appearing right  ovarian cyst.  Normal blood flow. -  LEFT OVARY:  4.2 x 1.9 x 2.7 cm. 2.9 x 2.6 x 1.7 cm simple appearing left  ovarian cyst..  Normal blood flow. No significant free fluid. Impression    - No acute findings in the pelvis. - Bilateral simple ovarian cysts, measuring up to 4.3 cm and the right ovary,  likely benign in premenopausal patient.    - 2.1 cm intramural fibroid.          Pregnancy, Urine   Result Value Ref Range    HCG(Urine) Pregnancy Test Negative NEG     CBC with Auto Differential   Result Value Ref Range    WBC 6.5 4.3 - 11.1 K/uL    RBC 3.49 (L) 4.05 - 5.2 M/uL    Hemoglobin 10.0 (L) 11.7 - 15.4 g/dL    Hematocrit 31.8 (L) 35.8 - 46.3 %    MCV 91.1 82.0 - 102.0 FL    MCH 28.7 26.1 - 32.9 PG    MCHC 31.4 31.4 - 35.0 g/dL    RDW 13.0 11.9 - 14.6 %    Platelets 407 661 - 548 K/uL    MPV 9.8 9.4 - 12.3 FL    nRBC 0.00 0.0 - 0.2 K/uL    Differential Type AUTOMATED      Seg Neutrophils 67 43 - 78 %    Lymphocytes 26 13 - 44 %    Monocytes 5 4.0 - 12.0 %    Eosinophils % 1 0.5 - 7.8 %    Basophils 1 0.0 - 2.0 %    Immature Granulocytes 1 0.0 - 5.0 %    Segs Absolute 4.3 1.7 - 8.2 K/UL    Absolute Lymph # 1.7 0.5 - 4.6 K/UL    Absolute Mono # 0.3 0.1 - 1.3 K/UL    Absolute Eos # 0.1 0.0 - 0.8 K/UL    Basophils Absolute 0.1 0.0 - 0.2 K/UL    Absolute Immature Granulocyte 0.0 0.0 - 0.5 K/UL   Protime-INR   Result Value Ref Range    Protime 13.0 12.6 - 14.3 sec    INR 0.9     TSH with Reflex   Result Value Ref Range    TSH w Free Thyroid if Abnormal 1.65 0.358 - 3.740 UIU/ML   Comprehensive Metabolic Panel   Result Value Ref Range    Sodium 137 133 - 143 mmol/L    Potassium 3.8 3.5 - 5.1 mmol/L    Chloride 104 101 - 110 mmol/L    CO2 27 21 - 32 mmol/L    Anion Gap 6 2 - 11 mmol/L    Glucose 84 65 - 100 mg/dL    BUN 11 6 - 23 MG/DL    Creatinine 0.78 0.6 - 1.0 MG/DL    Est, Glom Filt Rate >60 >60 ml/min/1.73m2    Calcium 9.6 8.3 - 10.4 MG/DL    Total Bilirubin 0.2 0.2 - 1.1 MG/DL    ALT 18 12 - 65 U/L    AST 15 15 - 37 U/L    Alk Phosphatase 74 50 - 136 U/L    Total Protein 8.5 (H) 6.3 - 8.2 g/dL    Albumin 4.0 3.5 - 5.0 g/dL    Globulin 4.5 2.8 - 4.5 g/dL    Albumin/Globulin Ratio 0.9 0.4 - 1.6          US PELVIS COMPLETE   Final Result      - No acute findings in the pelvis. - Bilateral simple ovarian cysts, measuring up to 4.3 cm and the right ovary,   likely benign in premenopausal patient.      - 2.1 cm intramural fibroid. Voice dictation software was used during the making of this note.   This software is not perfect and grammatical and other typographical errors may be present. This note has not been completely proofread for errors.      Obdulia Jeong  11/07/22 1438

## 2023-02-10 ENCOUNTER — OFFICE VISIT (OUTPATIENT)
Dept: OBGYN CLINIC | Age: 38
End: 2023-02-10

## 2023-02-10 VITALS
HEIGHT: 68 IN | BODY MASS INDEX: 28.64 KG/M2 | WEIGHT: 189 LBS | SYSTOLIC BLOOD PRESSURE: 112 MMHG | DIASTOLIC BLOOD PRESSURE: 70 MMHG

## 2023-02-10 DIAGNOSIS — N92.6 IRREGULAR MENSES: Primary | ICD-10-CM

## 2023-02-10 DIAGNOSIS — Z12.4 PAP SMEAR FOR CERVICAL CANCER SCREENING: ICD-10-CM

## 2023-02-10 LAB
HCG, PREGNANCY, URINE, POC: NEGATIVE
VALID INTERNAL CONTROL, POC: YES

## 2023-02-10 RX ORDER — ACETAMINOPHEN AND CODEINE PHOSPHATE 120; 12 MG/5ML; MG/5ML
1 SOLUTION ORAL DAILY
Qty: 28 TABLET | Refills: 12 | Status: SHIPPED | OUTPATIENT
Start: 2023-02-10 | End: 2023-03-10

## 2023-02-10 SDOH — ECONOMIC STABILITY: FOOD INSECURITY: WITHIN THE PAST 12 MONTHS, THE FOOD YOU BOUGHT JUST DIDN'T LAST AND YOU DIDN'T HAVE MONEY TO GET MORE.: NEVER TRUE

## 2023-02-10 SDOH — ECONOMIC STABILITY: INCOME INSECURITY: HOW HARD IS IT FOR YOU TO PAY FOR THE VERY BASICS LIKE FOOD, HOUSING, MEDICAL CARE, AND HEATING?: SOMEWHAT HARD

## 2023-02-10 SDOH — ECONOMIC STABILITY: FOOD INSECURITY: WITHIN THE PAST 12 MONTHS, YOU WORRIED THAT YOUR FOOD WOULD RUN OUT BEFORE YOU GOT MONEY TO BUY MORE.: NEVER TRUE

## 2023-02-10 SDOH — ECONOMIC STABILITY: HOUSING INSECURITY
IN THE LAST 12 MONTHS, WAS THERE A TIME WHEN YOU DID NOT HAVE A STEADY PLACE TO SLEEP OR SLEPT IN A SHELTER (INCLUDING NOW)?: NO

## 2023-02-10 ASSESSMENT — PATIENT HEALTH QUESTIONNAIRE - PHQ9
2. FEELING DOWN, DEPRESSED OR HOPELESS: 0
SUM OF ALL RESPONSES TO PHQ QUESTIONS 1-9: 0
SUM OF ALL RESPONSES TO PHQ QUESTIONS 1-9: 0
1. LITTLE INTEREST OR PLEASURE IN DOING THINGS: 0
SUM OF ALL RESPONSES TO PHQ9 QUESTIONS 1 & 2: 0
SUM OF ALL RESPONSES TO PHQ QUESTIONS 1-9: 0
SUM OF ALL RESPONSES TO PHQ QUESTIONS 1-9: 0

## 2023-02-10 NOTE — PROGRESS NOTES
Pt comes in today for a hospital follow up for vaginal bleeding. Pt reports it is nonstop, no pain, and the ER gave her medicine and it stopped the bleeding. Pt has not has a period since then. LAST PAP:  Unknown     LAST MAMMO:  Unknown     LMP:  Patient's last menstrual period was 10/11/2022.     BIRTH CONTROL:  none    TOBACCO USE:  No    FAMILY HISTORY OF:   Breast Cancer:  No   Ovarian Cancer:  No   Uterine Cancer:  No   Colon Cancer:  No    Vitals:    02/10/23 1013   BP: 112/70   Site: Left Upper Arm   Position: Sitting   Weight: 189 lb (85.7 kg)   Height: 5' 8\" (1.727 m)        Amy Weinstein MA  02/10/23  10:24 AM

## 2023-02-10 NOTE — ASSESSMENT & PLAN NOTE
x1 occurrence  Resolved with one month of mini pill but no menses since stopping mini pill mid sept  TSH, CBC, and CMP nl. US reviewed, unremarkable small 2.1 cm fibroid    No other worrisome symptoms  Pt desires IUD, currently uninsured. Given info for dhec and would like OCP bridge  rx sent  We discuss dosing/adminstration daily at same time.  UPT today neg, recommend repeat in 2 weeks

## 2023-02-10 NOTE — PROGRESS NOTES
Nesha Davison is a 30 y.o. [place] Erika who is here as new GYN for ER followup. Pts menses are normally Q 28 days, lasting 4-5 days. Started menses 10/11/22 and it lasted for a month. Pt went to ER on 2022 and had US and lab work completed. Started on norethindrone for 1 month which pt completed in December. No menses since then. Was under a lot of stress at that time. Denies acne, unwanted hair growth, HA, vision changes or nipple discharge. Weight has been stable, no changes. Not currently on birth control, would like IUD. Pt is secually active, not using condoms G1 with hx of C/S        Patient's last menstrual period was 10/11/2022. ROS:    Breast: Denies pain, lump or nipple discharge    GYN: Denies pelvic pain, discharge, itching, odor or dysuria. Constitutional: Negative for chills and fever. HENT: Negative for severe headaches or vision changes    Respiratory: Negative for cough and shortness of breath. Cardiovascular: Negative for chest pain and palpitations. Gastrointestinal: Negative for nausea and vomiting. Negative for diarrhea and constipation    Genitourinary: Negative for dysuria and hematuria. Musculoskeletal: Negative for back pain    Skin: Negative for rash and wound. Psych: No depression or anxiety          History reviewed. No pertinent past medical history.     Past Surgical History:   Procedure Laterality Date     SECTION         Family History   Problem Relation Age of Onset    Breast Cancer Neg Hx     Ovarian Cancer Neg Hx     Uterine Cancer Neg Hx     Colon Cancer Neg Hx        Social History     Socioeconomic History    Marital status:      Spouse name: Not on file    Number of children: Not on file    Years of education: Not on file    Highest education level: Not on file   Occupational History    Not on file   Tobacco Use    Smoking status: Never    Smokeless tobacco: Never   Substance and Sexual Activity    Alcohol use: Not Currently Drug use: Not Currently    Sexual activity: Yes     Partners: Male     Birth control/protection: None   Other Topics Concern    Not on file   Social History Narrative    Abuse: Feels safe at home, no history of physical abuse, no history of sexual abuse      Social Determinants of Health     Financial Resource Strain: Medium Risk    Difficulty of Paying Living Expenses: Somewhat hard   Food Insecurity: No Food Insecurity    Worried About Running Out of Food in the Last Year: Never true    Ran Out of Food in the Last Year: Never true   Transportation Needs: Unknown    Lack of Transportation (Medical): Not on file    Lack of Transportation (Non-Medical): No   Physical Activity: Not on file   Stress: Not on file   Social Connections: Not on file   Intimate Partner Violence: Not on file   Housing Stability: Unknown    Unable to Pay for Housing in the Last Year: Not on file    Number of Places Lived in the Last Year: Not on file    Unstable Housing in the Last Year: No           Objective:    Vitals:    02/10/23 1013   BP: 112/70   Site: Left Upper Arm   Position: Sitting   Weight: 189 lb (85.7 kg)   Height: 5' 8\" (1.727 m)           Constitutional:  well-developed, well-nourished, and in no distress. Mental: Alert and awake. Oriented to person/place/time. Able to follow commands    Eyes: EOM nl, Sclera nl, Ocular Discharge not visualized    HENT: Normocephalic and atraumatic. Mouth/Throat: Mucous membranes are moist. External Ears: nl. No cervical code adneopathy    Neck: Normal range of motion. Neck supple. No thyromegaly present. Cardiovascular: Normal rate and regular rhythm. Pulmonary: Effort normal; No visualized signs of difficulty breathing or respiratory distress    Abdominal: Soft. There is no tenderness. There is no rebound.      Pelvic Exam:       External: normal female genitalia without lesions or masses       Vagina: normal without lesions or masses, no discharge noted, no VB today Cervix: normal without lesions or masses       Adnexa: normal bimanual exam without masses or fullness       Uterus: uterus is normal size, shape, consistency and nontender    Musculoskeletal: Normal range of motion. Normal gait with no signs of ataxia     Neurological: No Facial Asymmetry (Cranial nerve 7 motor function); No gaze palsy; normal coordination, muscle strength and tone     Skin: Skin is warm and dry. No significant exanthematous lesions or discoloration noted on facial skin      Psych: Normal affect. No hallucinations            Assessment/Plan            Patient Active Problem List    Diagnosis Date Noted    Irregular menses 02/10/2023     Priority: Medium     Assessment & Plan Note:     x1 occurrence  Resolved with one month of mini pill but no menses since stopping mini pill mid sept  TSH, CBC, and CMP nl. US reviewed, unremarkable small 2.1 cm fibroid    No other worrisome symptoms  Pt desires IUD, currently uninsured. Given info for dhec and would like OCP bridge  rx sent  We discuss dosing/adminstration daily at same time. UPT today neg, recommend repeat in 2 weeks         Problem List Items Addressed This Visit          Other    Irregular menses - Primary     x1 occurrence  Resolved with one month of mini pill but no menses since stopping mini pill mid sept  TSH, CBC, and CMP nl. US reviewed, unremarkable small 2.1 cm fibroid    No other worrisome symptoms  Pt desires IUD, currently uninsured. Given info for dhec and would like OCP bridge  rx sent  We discuss dosing/adminstration daily at same time.  UPT today neg, recommend repeat in 2 weeks         Relevant Orders    AMB POC URINE PREGNANCY TEST, VISUAL COLOR COMPARISON (Completed)     Other Visit Diagnoses       Pap smear for cervical cancer screening        Relevant Orders    PAP IG, Aptima HPV and rfx 16/18,45 ([id number])            Orders Placed This Encounter   Procedures    PAP IG, Aptima HPV and rfx 16/18,45 ([id number])    AMB POC URINE PREGNANCY TEST, VISUAL COLOR COMPARISON       Outpatient Encounter Medications as of 2/10/2023   Medication Sig Dispense Refill    norethindrone (ORTHO MICRONOR) 0.35 MG tablet Take 1 tablet by mouth daily for 28 days 28 tablet 12    [DISCONTINUED] norethindrone (ORTHO MICRONOR) 0.35 MG tablet Take 1 tablet by mouth daily for 28 days 28 tablet 0     No facility-administered encounter medications on file as of 2/10/2023.

## 2023-02-19 ENCOUNTER — TELEPHONE (OUTPATIENT)
Dept: OBGYN CLINIC | Age: 38
End: 2023-02-19

## 2023-02-19 NOTE — TELEPHONE ENCOUNTER
Pap negative for any precancer/cancerous cells. Did test positive for HPV (not 16,18 or 45). Please review the following    We encourage you to stop smoking (if you do)  Take a multivitamin each day  Eat more cruciferous vegetables (sweet potatoes, spinach, kale, papaya, oranges, sweet peppers,and tomatoes) and other things high in antioxidants like green tea, pomegranate, dark chocolate, etc.  This will boost your immune system and help with your abnormal pap smear. Exercise 30minutes/day  To help prevent the spread of sexually transmitted diseases, condoms must be worn during sexual activity. If you have not yet received the HPV vaccine, we recommend you complete the series. Please schedule a follow-up appointment in 12 months for another pap smear.

## 2023-02-20 NOTE — TELEPHONE ENCOUNTER
RN called interpreting services. :  #[phone]    Patient was called, no answer,  left office phone number for call back. No other details given. Mychart status is \"pending\" at this point and time.

## 2023-02-21 NOTE — TELEPHONE ENCOUNTER
Interpretor: Romelle Baumgarten #[id number]    Called pt, no answer, Interpretor lvm for pt to call office back.

## 2023-02-22 ENCOUNTER — TELEPHONE (OUTPATIENT)
Dept: OBGYN CLINIC | Age: 38
End: 2023-02-22

## 2023-02-22 NOTE — TELEPHONE ENCOUNTER
Pt lvm stating she was returning a call. Called pt back, message below reviewed with pt, pt voiced understanding. Pt asked if she needed to take any medication. I stated she can take a multivitamin and doing all of the things listed will help get rid of the HPV. Pt also stated that when she went to the ER they said she had a cyst on her ovary. I stated that if Jazmyn saw something concerning then she would have mentioned in at her visit. Pt then asked why she has not had her period while on birth control. I stated that sometimes birth control will stop periods. Pt voiced understanding.

## 2023-03-07 ENCOUNTER — HOSPITAL ENCOUNTER (EMERGENCY)
Age: 38
Discharge: HOME | End: 2023-03-07
Attending: EMERGENCY MEDICINE

## 2023-03-07 ENCOUNTER — TELEPHONE (OUTPATIENT)
Dept: OBGYN CLINIC | Age: 38
End: 2023-03-07

## 2023-03-07 VITALS
HEART RATE: 68 BPM | DIASTOLIC BLOOD PRESSURE: 68 MMHG | OXYGEN SATURATION: 100 % | TEMPERATURE: 98.4 F | SYSTOLIC BLOOD PRESSURE: 104 MMHG | RESPIRATION RATE: 16 BRPM

## 2023-03-07 DIAGNOSIS — N93.8 DUB (DYSFUNCTIONAL UTERINE BLEEDING): Primary | ICD-10-CM

## 2023-03-07 LAB
ALBUMIN SERPL-MCNC: 3.5 G/DL (ref 3.5–5)
ALBUMIN/GLOB SERPL: 1 (ref 0.4–1.6)
ALP SERPL-CCNC: 49 U/L (ref 50–130)
ALT SERPL-CCNC: 24 U/L (ref 12–65)
ANION GAP SERPL CALC-SCNC: 5 MMOL/L (ref 2–11)
AST SERPL-CCNC: 23 U/L (ref 15–37)
BASOPHILS # BLD: 0 K/UL (ref 0–0.2)
BASOPHILS # BLD: 0 K/UL (ref 0–0.2)
BASOPHILS NFR BLD: 0 % (ref 0–2)
BASOPHILS NFR BLD: 1 % (ref 0–2)
BILIRUB SERPL-MCNC: 0.1 MG/DL (ref 0.2–1.1)
BUN SERPL-MCNC: 16 MG/DL (ref 6–23)
CALCIUM SERPL-MCNC: 8.8 MG/DL (ref 8.3–10.4)
CHLORIDE SERPL-SCNC: 107 MMOL/L (ref 101–110)
CO2 SERPL-SCNC: 28 MMOL/L (ref 21–32)
CREAT SERPL-MCNC: 0.69 MG/DL (ref 0.6–1)
DIFFERENTIAL METHOD BLD: ABNORMAL
DIFFERENTIAL METHOD BLD: ABNORMAL
EOSINOPHIL # BLD: 0.1 K/UL (ref 0–0.8)
EOSINOPHIL # BLD: 0.1 K/UL (ref 0–0.8)
EOSINOPHIL NFR BLD: 2 % (ref 0.5–7.8)
EOSINOPHIL NFR BLD: 2 % (ref 0.5–7.8)
ERYTHROCYTE [DISTWIDTH] IN BLOOD BY AUTOMATED COUNT: 22.9 % (ref 11.9–14.6)
ERYTHROCYTE [DISTWIDTH] IN BLOOD BY AUTOMATED COUNT: 23 % (ref 11.9–14.6)
GLOBULIN SER CALC-MCNC: 3.5 G/DL (ref 2.8–4.5)
GLUCOSE SERPL-MCNC: 116 MG/DL (ref 65–100)
HCG UR QL: NEGATIVE
HCT VFR BLD AUTO: 30.1 % (ref 35.8–46.3)
HCT VFR BLD AUTO: 30.1 % (ref 35.8–46.3)
HGB BLD-MCNC: 9.5 G/DL (ref 11.7–15.4)
HGB BLD-MCNC: 9.5 G/DL (ref 11.7–15.4)
IMM GRANULOCYTES # BLD AUTO: 0 K/UL (ref 0–0.5)
IMM GRANULOCYTES # BLD AUTO: 0 K/UL (ref 0–0.5)
IMM GRANULOCYTES NFR BLD AUTO: 0 % (ref 0–5)
IMM GRANULOCYTES NFR BLD AUTO: 0 % (ref 0–5)
LYMPHOCYTES # BLD: 1.8 K/UL (ref 0.5–4.6)
LYMPHOCYTES # BLD: 2 K/UL (ref 0.5–4.6)
LYMPHOCYTES NFR BLD: 38 % (ref 13–44)
LYMPHOCYTES NFR BLD: 41 % (ref 13–44)
MCH RBC QN AUTO: 27.8 PG (ref 26.1–32.9)
MCH RBC QN AUTO: 27.8 PG (ref 26.1–32.9)
MCHC RBC AUTO-ENTMCNC: 31.6 G/DL (ref 31.4–35)
MCHC RBC AUTO-ENTMCNC: 31.6 G/DL (ref 31.4–35)
MCV RBC AUTO: 88 FL (ref 82–102)
MCV RBC AUTO: 88 FL (ref 82–102)
MONOCYTES # BLD: 0.3 K/UL (ref 0.1–1.3)
MONOCYTES # BLD: 0.4 K/UL (ref 0.1–1.3)
MONOCYTES NFR BLD: 7 % (ref 4–12)
MONOCYTES NFR BLD: 8 % (ref 4–12)
NEUTS SEG # BLD: 2.4 K/UL (ref 1.7–8.2)
NEUTS SEG # BLD: 2.5 K/UL (ref 1.7–8.2)
NEUTS SEG NFR BLD: 50 % (ref 43–78)
NEUTS SEG NFR BLD: 52 % (ref 43–78)
NRBC # BLD: 0 K/UL (ref 0–0.2)
NRBC # BLD: 0 K/UL (ref 0–0.2)
PLATELET # BLD AUTO: 275 K/UL (ref 150–450)
PLATELET # BLD AUTO: 291 K/UL (ref 150–450)
PMV BLD AUTO: 10.2 FL (ref 9.4–12.3)
PMV BLD AUTO: 9.8 FL (ref 9.4–12.3)
POTASSIUM SERPL-SCNC: 3.7 MMOL/L (ref 3.5–5.1)
PROT SERPL-MCNC: 7 G/DL (ref 6.3–8.2)
RBC # BLD AUTO: 3.42 M/UL (ref 4.05–5.2)
RBC # BLD AUTO: 3.42 M/UL (ref 4.05–5.2)
SODIUM SERPL-SCNC: 140 MMOL/L (ref 133–143)
WBC # BLD AUTO: 4.8 K/UL (ref 4.3–11.1)
WBC # BLD AUTO: 4.8 K/UL (ref 4.3–11.1)

## 2023-03-07 PROCEDURE — 85025 COMPLETE CBC W/AUTO DIFF WBC: CPT

## 2023-03-07 PROCEDURE — 99283 EMERGENCY DEPT VISIT LOW MDM: CPT

## 2023-03-07 PROCEDURE — 80053 COMPREHEN METABOLIC PANEL: CPT

## 2023-03-07 PROCEDURE — 81025 URINE PREGNANCY TEST: CPT

## 2023-03-07 ASSESSMENT — ENCOUNTER SYMPTOMS
VOMITING: 0
NAUSEA: 0
COLOR CHANGE: 0
ABDOMINAL PAIN: 0
SHORTNESS OF BREATH: 0
DIARRHEA: 0

## 2023-03-07 NOTE — TELEPHONE ENCOUNTER
#[id number]    RN called patient, patient verified PHIs, RN asked patient to clarify their concerns. Patient states they have had vaginal bleeding for approx. 10 days now even with POPs. RN scheduled appointment for patient on 3/21/2023 regarding vaginal bleeding and educated patient on s/s that would warrant a visit to the ED again (soaking through a pad or more in an hour or less, aches/fever/chills, and/or severed abdominal cramping). Patient denies those S/S at this point and time. RN also asked patient if they have called the health department about an IUD, patient denies calling the health department due to them wanting to see how the POPs would work. RN encouraged patient to call health department to obtain more information on how that process would look like. Patient verbalized understanding.

## 2023-03-07 NOTE — ED TRIAGE NOTES
Pt ambulatory to triage with c/o vaginal bleeding X10 days with some clots. Pt reports she recently started taking birth control. Pt reports using 1 pad per hour.

## 2023-03-08 NOTE — DISCHARGE INSTRUCTIONS
You were evaluated in the emergency department today for abnormal uterine bleeding. Your hemoglobin 9.5 on your first lab draw  Repeat hemoglobin after 3 hours 9.5. Rest of your lab work is very reassuring    Contact your GYN provider tomorrow and let them know that you were evaluated in the emergency department today for abnormal uterine bleeding. Let them know that your hemoglobin is 9.5 and that the emergency department recommended that you have a follow-up towards the end of this week, beginning of next week.     Return to the emergency department if you are having shortness of breath, lightheadedness, dizziness, chest pain

## 2023-03-08 NOTE — ED NOTES
Patient started bleeding 10 days. Sometimes she has cramps but not real bad abdominal pain.       Larry Barker RN  03/07/23 1910

## 2023-03-08 NOTE — ED PROVIDER NOTES
Emergency Department Provider Note                   PCP:                None Provider               Age: 30 y.o. Sex: female     DISPOSITION Decision To Discharge 03/07/2023 11:18:41 PM       ICD-10-CM    1. DUB (dysfunctional uterine bleeding)  N93.8           MEDICAL DECISION MAKING  Complexity of Problems Addressed:  1 or more acute illnesses that pose a threat to life or bodily function. Data Reviewed and Analyzed:  Category 1:   I reviewed records from an external source: provider visit notes from PCP. I reviewed records from an external source: provider visit notes from outside specialist.  I ordered each unique test.  I reviewed the results of each unique test.        Category 2:     I independently ordered and reviewed the labs    Category 3: Discussion of management or test interpretation. 44-year-old female with history of abnormal uterine bleeding presents to the emergency department today with chief complaint 10 days of heavy vaginal bleeding that consisted of soaking through a thick menstrual pad and under an hour 24 hours a day. Patient denies any associated symptoms. Patient states she has an appointment with her GYN in approximately 2 weeks. States that the last time she had a heavy abnormal uterine bleeding like this she had lightheadedness and dizziness. She does not have any symptoms at this time. Had review of notes demonstrates that she contacted [address] [place] [address]  To discuss her heavy vaginal bleeding. Was informed of signs and symptoms that would warrant her visit to the emergency department by GYN and's office and at that time patient denied any symptoms, denied bleeding through thick pad and under an hour. Urine hCG negative    Basic lab work obtained    No concerning findings on CMP  On CBC, patient has a hemoglobin of 9.5.     Considering that patient stated that she has been having such heavy vaginal bleeding that has been soaking through a pad every hour for the past 10 days, contacted [facility] hospitalist regarding neck steps such as repeat CBC versus admit. OB hospitalist advised repeat CBC in 3 hours. If significant drop in hemoglobin or drop in hemoglobin, to call the MD covering for Dr. Travis Ag service. Repeat hemoglobin 9.5. Patient's repeat CBC unchanged from prior. Based on history, physical exam, work-up today in the emergency department, I do not feel additional lab work nor any imaging is warranted at this time. Patient had an ultrasound 11/7/22 in the emergency department for abnormal uterine bleeding and it was noted she has a fibroid. Patient is not having any pelvic pain at this time and she is hCG negative. Patient is stable and can be discharged home with follow-up to her GYN. Discussed work-up, follow-up, return to ED precautions with patient and her . Answered any questions that they had. Included signs and symptoms that would warrant prompt return to the emergency department and discharge paperwork. Patient discharged home in stable condition. She is to follow-up with OB/GYN. History obtained from patient  Patient very involved in shared decision-making. ED Course as of 03/07/23 2326   Tue Mar 07, 2023   1926 On CBC, no leukocytosis, no leukopenia. Hemoglobin is 9.5. Hematocrit also decreased at 30. 1.  4 months ago hemoglobin was 10.0. [JG]   1959 Urine hCG negative [JG]   1959 On CMP, no electrolyte derangements, no indication of SAYDA, no elevation in ALT AST or alk phos [JG]   2000 Spoke with OB hospitalist regarding patient's stated significant soaking through a thick menstrual pads every hour per 24 hours for the past 10 days. It was recommended that repeat CBC be performed in 3 hours to recheck hemoglobin. Then will contact Dr. Nely Silva who is OB on-call for Travis Ag as patient is managed by Dr. Berenice Caal RANDELL [JG]   21  Repeat CBC unchanged from previous CBC 3 hours prior.   No indication of significant bleeding at this time. [JG]      ED Course User Index  [JG] HARDEEP Crook       Risk of Complications and/or Morbidity of Patient Management:  OTC drug management performed, Patient was discharged risks and benefits of hospitalization were considered, and discussed this patient with OB hospitalist      Is this patient to be included in the SEP-1 core measure due to severe sepsis or septic shock? No Exclusion criteria - the patient is NOT to be included for SEP-1 Core Measure due to: Infection is not suspected     Chato Acevedo is a 30 y.o. female who presents to the Emergency Department with chief complaint of    Chief Complaint   Patient presents with    Vaginal Bleeding      51-year-old female with history of irregular menses,  section presents to the emergency department with chief complaint of Daily heavy vaginal bleeding for the past 10 days. Patient states she has a history of heavy irregular periods and is currently on oral birth control and has been on medication for the past 4 weeks. States that for the past 10 days she has been bleeding through ultra thick menstrual pad per hour 24 hours a day for the past 10 days. Patient denies any shortness of breath, chest pain, weakness, fatigue, lightheadedness. The history is provided by the patient. No  was used. Review of Systems   Constitutional:  Negative for chills, fatigue and fever. Respiratory:  Negative for shortness of breath. Cardiovascular:  Negative for chest pain and palpitations. Gastrointestinal:  Negative for abdominal pain, diarrhea, nausea and vomiting. Genitourinary:  Positive for vaginal bleeding. Negative for pelvic pain. Skin:  Negative for color change. Neurological:  Negative for weakness and headaches. All other systems reviewed and are negative. Vitals signs and nursing note reviewed. No data found. Physical Exam  Vitals and nursing note reviewed.    Constitutional: General: She is not in acute distress. Appearance: Normal appearance. She is obese. She is not ill-appearing, toxic-appearing or diaphoretic. HENT:      Head: Atraumatic. Nose: Nose normal.      Mouth/Throat:      Mouth: Mucous membranes are moist.      Pharynx: Oropharynx is clear. Eyes:      General: No scleral icterus. Extraocular Movements: Extraocular movements intact. Conjunctiva/sclera: Conjunctivae normal.      Comments: No conjunctival pallor   Cardiovascular:      Rate and Rhythm: Normal rate. Pulses: Normal pulses. Heart sounds: Normal heart sounds. Pulmonary:      Effort: Pulmonary effort is normal.      Breath sounds: Normal breath sounds. Abdominal:      General: Bowel sounds are normal.      Palpations: Abdomen is soft. Tenderness: There is no abdominal tenderness. There is no guarding or rebound. Comments: No tenderness over pelvic area   Musculoskeletal:         General: Normal range of motion. Cervical back: Normal range of motion. Skin:     General: Skin is warm and dry. Capillary Refill: Capillary refill takes less than 2 seconds. Coloration: Skin is not pale. Neurological:      General: No focal deficit present. Mental Status: She is alert and oriented to person, place, and time. Psychiatric:         Mood and Affect: Mood normal.         Behavior: Behavior normal.         Thought Content: Thought content normal.         Judgment: Judgment normal.        Procedures    ED Course as of 03/07/23 2326   Tue Mar 07, 2023   1926 On CBC, no leukocytosis, no leukopenia. Hemoglobin is 9.5. Hematocrit also decreased at 30. 1.  4 months ago hemoglobin was 10.0. [JG]   1959 Urine hCG negative [JG]   1959 On CMP, no electrolyte derangements, no indication of SAYDA, no elevation in ALT AST or alk phos [JG]   2000 Spoke with OB hospitalist regarding patient's stated significant soaking through a thick menstrual pads every hour per 24 hours for the past 10 days. It was recommended that repeat CBC be performed in 3 hours to recheck hemoglobin. Then will contact Dr. Gilberto Simeon who is OB on-call for [facility] as patient is managed by Dr. Juan Manuel Yates RANDELL [JG]   21 [phone] Repeat CBC unchanged from previous CBC 3 hours prior. No indication of significant bleeding at this time. [JG]      ED Course User Index  [JG] HARDEEP Maher        Orders Placed This Encounter   Procedures    Comprehensive Metabolic Panel    CBC with Auto Differential    CBC with Auto Differential    POC PREGNANCY UR-QUAL    POC Pregnancy Urine Qual        Medications - No data to display    New Prescriptions    No medications on file        History reviewed. No pertinent past medical history.      Past Surgical History:   Procedure Laterality Date     SECTION          Family History   Problem Relation Age of Onset    Breast Cancer Neg Hx     Ovarian Cancer Neg Hx     Uterine Cancer Neg Hx     Colon Cancer Neg Hx         Social History     Socioeconomic History    Marital status:      Spouse name: None    Number of children: None    Years of education: None    Highest education level: None   Tobacco Use    Smoking status: Never    Smokeless tobacco: Never   Substance and Sexual Activity    Alcohol use: Not Currently    Drug use: Not Currently    Sexual activity: Yes     Partners: Male     Birth control/protection: None   Social History Narrative    Abuse: Feels safe at home, no history of physical abuse, no history of sexual abuse      Social Determinants of Health     Financial Resource Strain: Medium Risk    Difficulty of Paying Living Expenses: Somewhat hard   Food Insecurity: No Food Insecurity    Worried About Running Out of Food in the Last Year: Never true    Ran Out of Food in the Last Year: Never true   Transportation Needs: Unknown    Lack of Transportation (Non-Medical): No   Housing Stability: Unknown    Unstable Housing in the Last Year: No        Allergies: Patient has no known allergies.     Previous Medications    NORETHINDRONE (ORTHO MICRONOR) 0.35 MG TABLET    Take 1 tablet by mouth daily for 28 days        Results for orders placed or performed during the hospital encounter of 03/07/23   Comprehensive Metabolic Panel   Result Value Ref Range    Sodium 140 133 - 143 mmol/L    Potassium 3.7 3.5 - 5.1 mmol/L    Chloride 107 101 - 110 mmol/L    CO2 28 21 - 32 mmol/L    Anion Gap 5 2 - 11 mmol/L    Glucose 116 (H) 65 - 100 mg/dL    BUN 16 6 - 23 MG/DL    Creatinine 0.69 0.6 - 1.0 MG/DL    Est, Glom Filt Rate >60 >60 ml/min/1.73m2    Calcium 8.8 8.3 - 10.4 MG/DL    Total Bilirubin 0.1 (L) 0.2 - 1.1 MG/DL    ALT 24 12 - 65 U/L    AST 23 15 - 37 U/L    Alk Phosphatase 49 (L) 50 - 130 U/L    Total Protein 7.0 6.3 - 8.2 g/dL    Albumin 3.5 3.5 - 5.0 g/dL    Globulin 3.5 2.8 - 4.5 g/dL    Albumin/Globulin Ratio 1.0 0.4 - 1.6     CBC with Auto Differential   Result Value Ref Range    WBC 4.8 4.3 - 11.1 K/uL    RBC 3.42 (L) 4.05 - 5.2 M/uL    Hemoglobin 9.5 (L) 11.7 - 15.4 g/dL    Hematocrit 30.1 (L) 35.8 - 46.3 %    MCV 88.0 82.0 - 102.0 FL    MCH 27.8 26.1 - 32.9 PG    MCHC 31.6 31.4 - 35.0 g/dL    RDW 22.9 (H) 11.9 - 14.6 %    Platelets 023 832 - 915 K/uL    MPV 9.8 9.4 - 12.3 FL    nRBC 0.00 0.0 - 0.2 K/uL    Differential Type AUTOMATED      Seg Neutrophils 50 43 - 78 %    Lymphocytes 41 13 - 44 %    Monocytes 7 4.0 - 12.0 %    Eosinophils % 2 0.5 - 7.8 %    Basophils 0 0.0 - 2.0 %    Immature Granulocytes 0 0.0 - 5.0 %    Segs Absolute 2.4 1.7 - 8.2 K/UL    Absolute Lymph # 2.0 0.5 - 4.6 K/UL    Absolute Mono # 0.3 0.1 - 1.3 K/UL    Absolute Eos # 0.1 0.0 - 0.8 K/UL    Basophils Absolute 0.0 0.0 - 0.2 K/UL    Absolute Immature Granulocyte 0.0 0.0 - 0.5 K/UL   CBC with Auto Differential   Result Value Ref Range    WBC 4.8 4.3 - 11.1 K/uL    RBC 3.42 (L) 4.05 - 5.2 M/uL    Hemoglobin 9.5 (L) 11.7 - 15.4 g/dL    Hematocrit 30.1 (L) 35.8 - 46.3 %    MCV 88.0 82.0 - 102.0 FL MCH 27.8 26.1 - 32.9 PG    MCHC 31.6 31.4 - 35.0 g/dL    RDW 23.0 (H) 11.9 - 14.6 %    Platelets 388 815 - 569 K/uL    MPV 10.2 9.4 - 12.3 FL    nRBC 0.00 0.0 - 0.2 K/uL    Differential Type AUTOMATED      Seg Neutrophils 52 43 - 78 %    Lymphocytes 38 13 - 44 %    Monocytes 8 4.0 - 12.0 %    Eosinophils % 2 0.5 - 7.8 %    Basophils 1 0.0 - 2.0 %    Immature Granulocytes 0 0.0 - 5.0 %    Segs Absolute 2.5 1.7 - 8.2 K/UL    Absolute Lymph # 1.8 0.5 - 4.6 K/UL    Absolute Mono # 0.4 0.1 - 1.3 K/UL    Absolute Eos # 0.1 0.0 - 0.8 K/UL    Basophils Absolute 0.0 0.0 - 0.2 K/UL    Absolute Immature Granulocyte 0.0 0.0 - 0.5 K/UL   POC Pregnancy Urine Qual   Result Value Ref Range    Preg Test, Ur Negative NEG          No orders to display                     Voice dictation software was used during the making of this note. This software is not perfect and grammatical and other typographical errors may be present. This note has not been completely proofread for errors.       Brinda Wagonerma  03/07/23 5319

## 2023-03-08 NOTE — ED NOTES
I have reviewed discharge instructions with the patient and spouse.  The patient and spouse verbalized understanding.    Patient left ED via Discharge Method: ambulatory to Home with family.    Opportunity for questions and clarification provided.       Patient given 0 scripts.         To continue your aftercare when you leave the hospital, you may receive an automated call from our care team to check in on how you are doing.  This is a free service and part of our promise to provide the best care and service to meet your aftercare needs.” If you have questions, or wish to unsubscribe from this service please call [phone].  Thank you for Choosing our [facility] Emergency Department.          Cornel Coffman RN  03/07/23 6228

## 2024-02-12 ENCOUNTER — OFFICE VISIT (OUTPATIENT)
Dept: OBGYN CLINIC | Age: 39
End: 2024-02-12

## 2024-02-12 VITALS
SYSTOLIC BLOOD PRESSURE: 110 MMHG | WEIGHT: 189 LBS | BODY MASS INDEX: 28.64 KG/M2 | DIASTOLIC BLOOD PRESSURE: 68 MMHG | HEIGHT: 68 IN

## 2024-02-12 DIAGNOSIS — Z12.4 PAP SMEAR FOR CERVICAL CANCER SCREENING: Primary | ICD-10-CM

## 2024-02-12 DIAGNOSIS — D64.9 ANEMIA, UNSPECIFIED TYPE: ICD-10-CM

## 2024-02-12 DIAGNOSIS — Z01.419 WOMEN'S ANNUAL ROUTINE GYNECOLOGICAL EXAMINATION: ICD-10-CM

## 2024-02-12 LAB
ERYTHROCYTE [DISTWIDTH] IN BLOOD BY AUTOMATED COUNT: 12.3 % (ref 11.9–14.6)
FERRITIN SERPL-MCNC: 9 NG/ML (ref 8–388)
FOLATE SERPL-MCNC: 8.9 NG/ML (ref 3.1–17.5)
HCT VFR BLD AUTO: 38.3 % (ref 35.8–46.3)
HGB BLD-MCNC: 12.2 G/DL (ref 11.7–15.4)
HGB RETIC QN AUTO: 37 PG (ref 29–35)
IMM RETICS NFR: 16.8 % (ref 3–15.9)
IRON SATN MFR SERPL: 18 %
IRON SERPL-MCNC: 70 UG/DL (ref 35–150)
MCH RBC QN AUTO: 31.9 PG (ref 26.1–32.9)
MCHC RBC AUTO-ENTMCNC: 31.9 G/DL (ref 31.4–35)
MCV RBC AUTO: 100 FL (ref 82–102)
NRBC # BLD: 0 K/UL (ref 0–0.2)
PLATELET # BLD AUTO: 336 K/UL (ref 150–450)
PMV BLD AUTO: 10.3 FL (ref 9.4–12.3)
RBC # BLD AUTO: 3.83 M/UL (ref 4.05–5.2)
RETICS # AUTO: 0.09 M/UL (ref 0.03–0.1)
RETICS/RBC NFR AUTO: 2.5 % (ref 0.3–2)
TIBC SERPL-MCNC: 379 UG/DL (ref 250–450)
VIT B12 SERPL-MCNC: 325 PG/ML (ref 193–986)
WBC # BLD AUTO: 5.7 K/UL (ref 4.3–11.1)

## 2024-02-12 PROCEDURE — 99395 PREV VISIT EST AGE 18-39: CPT | Performed by: NURSE PRACTITIONER

## 2024-02-12 PROCEDURE — 99459 PELVIC EXAMINATION: CPT | Performed by: NURSE PRACTITIONER

## 2024-02-12 RX ORDER — ACETAMINOPHEN AND CODEINE PHOSPHATE 120; 12 MG/5ML; MG/5ML
1 SOLUTION ORAL DAILY
Qty: 84 TABLET | Refills: 3 | Status: SHIPPED | OUTPATIENT
Start: 2024-02-12

## 2024-02-12 NOTE — PROGRESS NOTES
Patient comes in today for annual exam. Patient needs refill on birth control. No other complaints/concerns today.     LAST PAP:  02/10/2023, Negative HPV positive (not 16, 18, 45)    LAST MAMMO:  Never    LMP:  Patient's last menstrual period was 01/26/2024 (exact date).    BIRTH CONTROL:  oral progesterone-only contraceptive    TOBACCO USE:  No    FAMILY HISTORY OF:   Breast Cancer:  No   Ovarian Cancer:  No   Uterine Cancer:  No   Colon Cancer:  No    Vitals:    02/12/24 0914   BP: 110/68   Site: Left Upper Arm   Position: Sitting   Weight: 85.7 kg (189 lb)   Height: 1.727 m (5' 8\")        Chaperone for Intimate Exam     Chaperone was offer accepted as part of the rooming process    Chaperone: Rachana Palacios MA  02/12/24  9:18 AM

## 2024-02-12 NOTE — PROGRESS NOTES
Bárbara Correia is a 38 y.o.  who is here for AE        Patient's last menstrual period was 2024 (exact date).    Menses:Q month, lasting 6-7 days, light flow with mini pill    Last Pap: neg, + HPV    Hx abnormal pap or STD:+ HPV    Hx of receiving HPV vaccination:no    Last Mammo: n/a    Fam Hx of Breast, ovarian or uterine cancer:denies    Sexually Active:yes    Number of partners in the last year:1          ROS:    Breast: Denies pain, lump or nipple discharge    GYN: Denies pelvic pain, discharge, itching, odor or dysuria.     Constitutional: Negative for chills and fever.     HENT: Negative for severe headaches or vision changes    Respiratory: Negative for cough and shortness of breath.      Cardiovascular: Negative for chest pain and palpitations.     Gastrointestinal: Negative for nausea and vomiting. Negative for diarrhea and constipation    Genitourinary: Negative for dysuria and hematuria.           History reviewed. No pertinent past medical history.    Past Surgical History:   Procedure Laterality Date     SECTION         Family History   Problem Relation Age of Onset    Breast Cancer Neg Hx     Ovarian Cancer Neg Hx     Uterine Cancer Neg Hx     Colon Cancer Neg Hx        Social History     Socioeconomic History    Marital status:      Spouse name: Not on file    Number of children: Not on file    Years of education: Not on file    Highest education level: Not on file   Occupational History    Not on file   Tobacco Use    Smoking status: Never    Smokeless tobacco: Never   Substance and Sexual Activity    Alcohol use: Not Currently    Drug use: Not Currently    Sexual activity: Yes     Partners: Male     Birth control/protection: None   Other Topics Concern    Not on file   Social History Narrative    Abuse: Feels safe at home, no history of physical abuse, no history of sexual abuse      Social Determinants of Health     Financial Resource Strain: Low Risk  (2024)

## 2024-02-12 NOTE — PROGRESS NOTES
I have reviewed the patient's visit today including history, exam and assessment by DARLENE Lamar.  I agree with treatment/plan as above.    Mingo Cameron MD  10:13 AM  02/12/24

## 2024-02-12 NOTE — ASSESSMENT & PLAN NOTE
Pap today  HPV and gardasil vaccine reviewed, acog handout and VIS statement reviewed.  Mammo n/a  Refill sent for mini pill  Recheck anemia today  Declines std screening  RTO 1 year

## 2024-02-21 LAB
COLLECTION METHOD: ABNORMAL
CYTOLOGIST CVX/VAG CYTO: ABNORMAL
CYTOLOGY CVX/VAG DOC THIN PREP: ABNORMAL
DATE OF LMP: ABNORMAL
HPV APTIMA: POSITIVE
HPV GENOTYPE 18,45: NEGATIVE
HPV GENOTYPE REFLEX: ABNORMAL
HPV, GENOTYPE 16: NEGATIVE
Lab: ABNORMAL
PAP SOURCE: ABNORMAL
PATH REPORT.FINAL DX SPEC: ABNORMAL
PATHOLOGIST CVX/VAG CYTO: ABNORMAL
PREV TREATMENT: ABNORMAL
STAT OF ADQ CVX/VAG CYTO-IMP: ABNORMAL

## 2024-02-22 ENCOUNTER — TELEPHONE (OUTPATIENT)
Dept: OBGYN CLINIC | Age: 39
End: 2024-02-22

## 2024-02-22 NOTE — TELEPHONE ENCOUNTER
Pap negative, but again + HPV. Since it has been 2 years in a row, ASCCP recommend proceeding with colposcopy. Please send pt acog handout on abnormal pap/colpo and schedule    Review the following    We encourage you to stop smoking (if you do)  Take a multivitamin each day  Eat more cruciferous vegetables (sweet potatoes, spinach, kale, papaya, oranges, sweet peppers,and tomatoes) and other things high in antioxidants like green tea, pomegranate, dark chocolate, etc.  This will boost your immune system and help with your abnormal pap smear.   Exercise 30minutes/day  To help prevent the spread of sexually transmitted diseases, condoms must be worn during sexual activity.   If you have not yet received the HPV vaccine, we recommend you complete the series.- if pt wants please schedule in > 1 month when our supply should be here    Please schedule a follow-up appointment in 12 months for another pap smear.

## 2024-02-26 NOTE — TELEPHONE ENCOUNTER
Called pt, message below reviewed with pt, pt voiced understanding.   Pt asked how much the procedure would be out of pocket. Informed her and estimate of $300 but that does not include the pathology/lab prices. Pt asked how much she had to pay up front. Informed pt that I was not sure and that I would need to ask  staff tomorrow. Pt voiced understanding.    dinCloud message sent

## 2024-02-28 NOTE — TELEPHONE ENCOUNTER
Called pt, informed pt that we are going to try to get her approved for BCN due to her not having medical insurance. Told pt that I will let her know if she gets approved or not. Pt voiced understanding.     Balta scheduled a month out.

## 2024-03-13 PROBLEM — Z01.419 WOMEN'S ANNUAL ROUTINE GYNECOLOGICAL EXAMINATION: Status: RESOLVED | Noted: 2024-02-12 | Resolved: 2024-03-13

## 2024-04-23 ENCOUNTER — PROCEDURE VISIT (OUTPATIENT)
Dept: OBGYN CLINIC | Age: 39
End: 2024-04-23

## 2024-04-23 VITALS
DIASTOLIC BLOOD PRESSURE: 74 MMHG | SYSTOLIC BLOOD PRESSURE: 112 MMHG | WEIGHT: 191 LBS | HEIGHT: 68 IN | BODY MASS INDEX: 28.95 KG/M2

## 2024-04-23 DIAGNOSIS — R87.810 CERVICAL HIGH RISK HPV (HUMAN PAPILLOMAVIRUS) TEST POSITIVE: Primary | ICD-10-CM

## 2024-04-23 LAB
HCG, PREGNANCY, URINE, POC: NEGATIVE
VALID INTERNAL CONTROL, POC: YES

## 2024-04-23 PROCEDURE — 81025 URINE PREGNANCY TEST: CPT | Performed by: OBSTETRICS & GYNECOLOGY

## 2024-04-23 PROCEDURE — 57454 BX/CURETT OF CERVIX W/SCOPE: CPT | Performed by: OBSTETRICS & GYNECOLOGY

## 2024-04-23 NOTE — PROGRESS NOTES
[facility]  [address], [place] [address]  [phone]    Mingo Cameron MD, FACOG  Jazmyn Stacy [facility]  Uyen Villela MD, FACOG    Colposcopy Procedure Note      Indications: This is a 38 y.o.  /  female,  who presents for a colposcopy.  Pap smear showed:     Problem List Items Addressed This Visit       Cervical high risk HPV (human papillomavirus) test positive - Primary     As per colpo form today             Procedure:  After informed consent obtained, pt placed in dorsal lithotomy position. Bivalved speculum placed in vagina without difficulty.  Acetic acid placed over entire face of cervix.  Colposcopy performed in usually fashion.  Entire SCJ seen - adequate colpo.  Acetowhite changes noted from 5-7, 10-12 o'clock  Biopsies done at 6, 11 o'clock  ECC also perfomed  Monsels used for hemostasis.  All specimens sent to path.  Pt tolerated procedure well.      Physical Exam  Genitourinary:                  Specimens: Cervical Bx x 2, ECC    Complications: none    Colposcopic Dx:  CHERI 1-2    Plan:    Follow up as needed or virtual visit in 2 weeks to discuss path results.  Routine post-procedure instructions given to pt.    The physical exam portion of the visit was chaperoned by female staff member     Mingo Cameron MD   8:45 AM  24

## 2024-04-23 NOTE — PROGRESS NOTES
Chaperone for Intimate Exam     Chaperone was offer accepted as part of the rooming process    Chaperone: Lidia JOSEPH CMA

## 2024-04-23 NOTE — PATIENT INSTRUCTIONS
Pelvic rest for at least the next 1-2 weeks - no tampons, sex, douching, sit down baths, etc.  You may take a shower. You can expect discharge to increase over the next few weeks. Please notify us if you develop severe abdominal or pelvic pain, a thick, foul smelling discharge, persistent nausea and vomiting or a fever over 101.0. You can take Ibuprofen 600 mg every 6 hours for cramping/pain.  Thanks for coming to see us today and letting us take care of you!

## 2024-04-23 NOTE — PROGRESS NOTES
Pt comes in today for colposcopy.     LAST PAP:  02/12/2024 negative, HPV positive     LAST MAMMO:  never     LMP:  Patient's last menstrual period was 04/13/2024 (exact date).    BIRTH CONTROL:  oral progesterone-only contraceptive    TOBACCO USE:  No    FAMILY HISTORY OF:   Breast Cancer:  No   Ovarian Cancer:  No   Uterine Cancer:  No   Colon Cancer:  No    Vitals:    04/23/24 0831   BP: 112/74   Site: Left Upper Arm   Position: Sitting   Weight: 86.6 kg (191 lb)   Height: 1.727 m (5' 8\")        Lidia Alves MA  04/23/24  8:44 AM

## 2024-04-24 PROBLEM — N87.0 CIN I (CERVICAL INTRAEPITHELIAL NEOPLASIA I): Status: ACTIVE | Noted: 2024-04-24

## 2024-05-09 ENCOUNTER — NURSE ONLY (OUTPATIENT)
Dept: OBGYN CLINIC | Age: 39
End: 2024-05-09

## 2024-05-09 DIAGNOSIS — N87.0 CIN I (CERVICAL INTRAEPITHELIAL NEOPLASIA I): Primary | ICD-10-CM

## 2024-05-09 PROCEDURE — 99212 OFFICE O/P EST SF 10 MIN: CPT | Performed by: OBSTETRICS & GYNECOLOGY

## 2024-05-09 NOTE — ASSESSMENT & PLAN NOTE
D/W patient findings of colpo/Bx.  D/W patient folllow up and surveillance, plan to repeat pap smear in 12 months with co-testing in accordance with ASCCP guidelines.  Also encouraged D/C tob, take MVIs and increase consumption of cruciferous vegetables and other antioxidants.

## 2024-05-09 NOTE — PROGRESS NOTES
[facility]  [address], [place] [address]  [phone]    Mingo Cameron MD, FACOG  Jazmyn Stacy [facility]  Uyen Villela MD, FACOG  Colpo Results Visit    Bárbara Correia is a 38 y.o. female evaluated via telephone/virtual video visit on 5/9/2024.      Consent:  Patient is aware that that she may receive a bill for this service, depending on her insurance coverage, and has provided verbal consent to proceed    I affirm this is a Patient Initiated Episode with an Established Patient who has not had a related appointment within my department in the past 7 days or scheduled within the next 24 hours.    Patient's medical, surgical and obstetric history, problem list, and current medications were reviewed and patient was deemed an appropriate candidate for virtual/telemedicine visits. Patient was offered this option and agreed to proceed with virtual/telemedicine visits    This visit was done with the [facility] Virtual Visit and all documentation was done in Epic.   Pt here for colpo results today.  No issues or complaints.  Denies any foul D/C, F/C, pelvic pain  Entire visit was spent in face to face counseling    There were no vitals filed for this visit.     Problem List Items Addressed This Visit       CHERI I (cervical intraepithelial neoplasia I) - Primary     D/W patient findings of colpo/Bx.  D/W patient folllow up and surveillance, plan to repeat pap smear in 12 months with co-testing in accordance with ASCCP guidelines.  Also encouraged D/C tob, take MVIs and increase consumption of cruciferous vegetables and other antioxidants.             Mingo Cameron MD  8:36 AM  05/09/24

## 2024-05-09 NOTE — PATIENT INSTRUCTIONS
We encourage you to stop smoking (if you do), take a multivitamin each day and eat more cruciferous vegetables (grown in the ground: cauliflower, broccoli, carrots, etc) and other things high in antioxidants like green tea, pomegranate, dark chocolate, etc.  This will boost your immune system and help with your abnormal pap smear.   Please schedule a follow-up appointment in 12 months for another pap smear.  Thanks for coming to see us today and letting us take care of you!

## 2024-05-09 NOTE — PROGRESS NOTES
Virtual visit fr colposcopy results.     LAST PAP:  2/12/2024, neg., HPV pos.     LAST MAMMO:  never     LMP:  Patient's last menstrual period was 04/13/2024 (exact date).    BIRTH CONTROL:  oral progesterone-only contraceptive    TOBACCO USE:  No    FAMILY HISTORY OF:   Breast Cancer:  No   Ovarian Cancer:  No   Uterine Cancer:  No   Colon Cancer:  No    BC FARLEY RN  05/09/24  8:33 AM

## 2025-07-31 ENCOUNTER — OFFICE VISIT (OUTPATIENT)
Dept: OBGYN CLINIC | Age: 40
End: 2025-07-31

## 2025-07-31 VITALS
BODY MASS INDEX: 30.16 KG/M2 | WEIGHT: 199 LBS | SYSTOLIC BLOOD PRESSURE: 114 MMHG | HEIGHT: 68 IN | DIASTOLIC BLOOD PRESSURE: 70 MMHG

## 2025-07-31 DIAGNOSIS — N87.0 CIN I (CERVICAL INTRAEPITHELIAL NEOPLASIA I): ICD-10-CM

## 2025-07-31 DIAGNOSIS — Z01.419 WOMEN'S ANNUAL ROUTINE GYNECOLOGICAL EXAMINATION: Primary | ICD-10-CM

## 2025-07-31 PROCEDURE — 99395 PREV VISIT EST AGE 18-39: CPT | Performed by: OBSTETRICS & GYNECOLOGY

## 2025-07-31 RX ORDER — ACETAMINOPHEN AND CODEINE PHOSPHATE 120; 12 MG/5ML; MG/5ML
1 SOLUTION ORAL DAILY
Qty: 84 TABLET | Refills: 3 | Status: SHIPPED | OUTPATIENT
Start: 2025-07-31

## 2025-07-31 NOTE — PROGRESS NOTES
[facility]  [address] [facility], [place] [address]  [phone]    Mingo Cameron MD, FACOG  Jazmyn Stacy LETICIA-BC      Assessment/Plan     Patient Active Problem List    Diagnosis Date Noted    Irregular menses 02/10/2023     Priority: Medium    CHERI I (cervical intraepithelial neoplasia I) 2024     Overview Note:     On colpo done 24       Assessment & Plan Note:     Repeat pap done today      Cervical high risk HPV (human papillomavirus) test positive 2024     Overview Note:     On pap done 24 (pap neg, neg 16/18/45)      Women's annual routine gynecological examination 2024     Overview Note:     Pap + HPV done 25       Assessment & Plan Note:     Exam as below  Encouraged annual exams with paps as indicated  Pt to F/U with PCP for all non-gyn health related issues         Problem List Items Addressed This Visit       Women's annual routine gynecological examination - Primary    Exam as below  Encouraged annual exams with paps as indicated  Pt to F/U with PCP for all non-gyn health related issues          CHERI I (cervical intraepithelial neoplasia I)    Repeat pap done today             Subjective     LAST PAP:  2024 neg, HPV pos   COLPO: 2024     LAST MAMMO:  never      LMP:  Patient's last menstrual period was 2025 (approximate).     HYSTERECTOMY: n/a     TUBAL: n/a     SMOKE: no     Bárbara Correia 39 y.o.  presents today for annual Gyn exam. Pt reports LLQ pain with movement. Denies any vaginal D/C, pelvic pain, non-menstrual pelvic pain, F/C, assoc GI/ issues. No breast issues. Denies any neuro changes, visual changes, H/A, CP, SOB.       OB History    Para Term  AB Living   1 1 1   1   SAB IAB Ectopic Molar Multiple Live Births        1      # Outcome Date GA Lbr Chace/2nd Weight Sex Type Anes PTL Lv   1 Term 2008    M CS-[id number]   KIET           Past Medical History:   Diagnosis Date    Abnormal Pap smear of cervix

## 2025-07-31 NOTE — PATIENT INSTRUCTIONS
We will call you if anything is abnormal from your testing today.  You can take ibuprofen 400-600 mg 2-3 times a day with food, use a heating pad to see if that helps.  I  Follow up as needed or next year for your yearly female exam.  Thanks for coming to see us today and letting us take care of you!

## 2025-07-31 NOTE — PROGRESS NOTES
Patient comes in today as a [facility] patient for AE.     LAST PAP:  02/12/2024 neg, HPV pos   COLPO: 05/09/2024    LAST MAMMO:  never     LMP:  Patient's last menstrual period was 07/14/2025 (approximate).    HYSTERECTOMY: n/a    TUBAL: n/a    SMOKE: no     Vitals:    07/31/25 0920   BP: 114/70   BP Site: Left Upper Arm   Patient Position: Sitting   Weight: 90.3 kg (199 lb)   Height: 1.727 m (5' 8\")        Lidia Alves MA  07/31/25  9:30 AM

## 2025-08-08 LAB
C TRACH RRNA CVX QL NAA+PROBE: NEGATIVE
COLLECTION METHOD: ABNORMAL
CYTOLOGIST CVX/VAG CYTO: ABNORMAL
CYTOLOGY CVX/VAG DOC THIN PREP: ABNORMAL
DATE OF LMP: ABNORMAL
HPV APTIMA: POSITIVE
HPV GENOTYPE REFLEX: ABNORMAL
Lab: ABNORMAL
N GONORRHOEA RRNA CVX QL NAA+PROBE: NEGATIVE
PAP SOURCE: ABNORMAL
PATH REPORT.FINAL DX SPEC: ABNORMAL
PATHOLOGIST CVX/VAG CYTO: ABNORMAL
PATHOLOGIST PROVIDED ICD: ABNORMAL
RECOM F/U CVX/VAG CYTO: ABNORMAL
STAT OF ADQ CVX/VAG CYTO-IMP: ABNORMAL
T VAGINALIS RRNA SPEC QL NAA+PROBE: NEGATIVE

## 2025-08-10 ENCOUNTER — RESULTS FOLLOW-UP (OUTPATIENT)
Dept: OBGYN CLINIC | Age: 40
End: 2025-08-10

## 2025-08-10 DIAGNOSIS — R87.612 LGSIL ON PAP SMEAR OF CERVIX: Primary | ICD-10-CM
